# Patient Record
Sex: MALE | Race: WHITE
[De-identification: names, ages, dates, MRNs, and addresses within clinical notes are randomized per-mention and may not be internally consistent; named-entity substitution may affect disease eponyms.]

---

## 2022-10-27 PROBLEM — Z00.00 ENCOUNTER FOR PREVENTIVE HEALTH EXAMINATION: Status: ACTIVE | Noted: 2022-10-27

## 2022-10-28 ENCOUNTER — APPOINTMENT (OUTPATIENT)
Dept: OTOLARYNGOLOGY | Facility: CLINIC | Age: 77
End: 2022-10-28

## 2022-10-28 VITALS — HEIGHT: 70 IN | WEIGHT: 170 LBS | TEMPERATURE: 97.1 F | BODY MASS INDEX: 24.34 KG/M2

## 2022-10-28 DIAGNOSIS — J32.0 CHRONIC MAXILLARY SINUSITIS: ICD-10-CM

## 2022-10-28 PROCEDURE — 99203 OFFICE O/P NEW LOW 30 MIN: CPT | Mod: 25

## 2022-10-28 PROCEDURE — 31231 NASAL ENDOSCOPY DX: CPT

## 2022-10-28 RX ORDER — AMOXICILLIN 500 MG/1
500 CAPSULE ORAL
Qty: 15 | Refills: 0 | Status: ACTIVE | COMMUNITY
Start: 2022-06-02

## 2022-10-28 RX ORDER — ATORVASTATIN CALCIUM 20 MG/1
20 TABLET, FILM COATED ORAL
Qty: 90 | Refills: 0 | Status: ACTIVE | COMMUNITY
Start: 2022-08-02

## 2022-10-28 RX ORDER — HYDROCODONE BITARTRATE AND ACETAMINOPHEN 7.5; 325 MG/1; MG/1
7.5-325 TABLET ORAL
Qty: 12 | Refills: 0 | Status: ACTIVE | COMMUNITY
Start: 2022-06-02

## 2022-10-28 RX ORDER — ASPIRIN 81 MG
81 TABLET, DELAYED RELEASE (ENTERIC COATED) ORAL
Refills: 0 | Status: ACTIVE | COMMUNITY

## 2022-10-28 RX ORDER — MIRTAZAPINE 15 MG/1
15 TABLET, FILM COATED ORAL
Qty: 90 | Refills: 0 | Status: ACTIVE | COMMUNITY
Start: 2022-06-13

## 2022-10-28 RX ORDER — SERTRALINE HYDROCHLORIDE 150 MG/1
150 CAPSULE ORAL
Refills: 0 | Status: ACTIVE | COMMUNITY

## 2022-10-28 RX ORDER — SPIRONOLACTONE AND HYDROCHLOROTHIAZIDE 25; 25 MG/1; MG/1
25-25 TABLET, FILM COATED ORAL
Qty: 90 | Refills: 0 | Status: ACTIVE | COMMUNITY
Start: 2022-08-18

## 2022-10-28 NOTE — HISTORY OF PRESENT ILLNESS
[de-identified] : Initial visit, referred in consultation.\par His chief complaint is "clogged left sinus".\par This gentleman is a very reliable detailed historian.  He reports that he had to left maxillary teeth extracted approximately 2 months ago.  The procedure was uneventful.\par He followed up with Dr. Sinclair to consider the process of dental implantation.  A dental scan was performed which showed what is being described as opacification of the sinus.  He brought the disc for review unfortunately we did not have the software to open it.\par \par He reports a longstanding history of sinus problems.\par However he is not complaining of malar pain or pressure.\par He is not complaining of symptoms consistent with an oral antral fistula.\par He does not have a complaint of a malodorous smell or taste.

## 2022-10-28 NOTE — ASSESSMENT
[FreeTextEntry1] : He has left maxillary pathology.  He understands that he needs a healthy maxillary sinus to proceed with dental implantation.\par The plan is to follow-up his culture and if a pathogen is identified to consider directed treatment.  This will then be followed by posttreatment CT scan imaging.\par If a pathogen is not identified he will moved to imaging of his paranasal sinuses in particular his ostiomeatal unit.\par I will see him in follow-up for treatment planning after his scan.

## 2022-10-28 NOTE — PROCEDURE
[FreeTextEntry6] : PROCEDURE NOTES\par \par PROCEDURE: Nasal endoscopy \par SURGEON: Dr. Fitzgerald\par INDICATIONS: Assess for chronic sinusitis. \par ANESTHESIA: The patient was placed in a sitting position.  Following application of the topical anesthetic and decongestant, exam was performed with a zero degree endoscope.  The scope was passed along the right nasal floor to the nasopharynx.  It was then passed into the region of the middle meatus, middle turbinate, and sphenoethmoid region.  An identical procedure was performed on the left side.  The following findings were noted:\par \par The nasal mucosa was healthy appearing and the septum was roughly midline. The middle meatus and sphenoethmoid recesses were clear bilaterally. The nasopharynx was normal. \par  \par Edema at the left ostiomeatal unit with some pus.  This was cultured.

## 2022-11-03 LAB — EAR NOSE AND THROAT CULTURE: NORMAL

## 2022-11-14 ENCOUNTER — APPOINTMENT (OUTPATIENT)
Dept: CT IMAGING | Facility: CLINIC | Age: 77
End: 2022-11-14

## 2022-11-14 ENCOUNTER — OUTPATIENT (OUTPATIENT)
Dept: OUTPATIENT SERVICES | Facility: HOSPITAL | Age: 77
LOS: 1 days | End: 2022-11-14

## 2022-11-14 ENCOUNTER — RESULT REVIEW (OUTPATIENT)
Age: 77
End: 2022-11-14

## 2022-11-14 PROCEDURE — 70486 CT MAXILLOFACIAL W/O DYE: CPT | Mod: 26,MH
